# Patient Record
Sex: FEMALE | Race: WHITE
[De-identification: names, ages, dates, MRNs, and addresses within clinical notes are randomized per-mention and may not be internally consistent; named-entity substitution may affect disease eponyms.]

---

## 2019-02-14 ENCOUNTER — HOSPITAL ENCOUNTER (INPATIENT)
Dept: HOSPITAL 74 - JLDR | Age: 34
LOS: 3 days | Discharge: HOME | DRG: 560 | End: 2019-02-17
Attending: OBSTETRICS & GYNECOLOGY | Admitting: OBSTETRICS & GYNECOLOGY
Payer: COMMERCIAL

## 2019-02-14 VITALS — BODY MASS INDEX: 33.1 KG/M2

## 2019-02-14 DIAGNOSIS — Z3A.39: ICD-10-CM

## 2019-02-14 LAB
AMPHET UR-MCNC: NEGATIVE NG/ML
ANION GAP SERPL CALC-SCNC: 10 MMOL/L (ref 8–16)
APTT BLD: 26.9 SECONDS (ref 25.2–36.5)
BARBITURATES UR-MCNC: NEGATIVE NG/ML
BASOPHILS # BLD: 0.1 % (ref 0–2)
BENZODIAZ UR SCN-MCNC: NEGATIVE NG/ML
BUN SERPL-MCNC: 10 MG/DL (ref 7–18)
CALCIUM SERPL-MCNC: 7.6 MG/DL (ref 8.5–10.1)
CHLORIDE SERPL-SCNC: 106 MMOL/L (ref 98–107)
CO2 SERPL-SCNC: 23 MMOL/L (ref 21–32)
COCAINE UR-MCNC: NEGATIVE NG/ML
CREAT SERPL-MCNC: 0.6 MG/DL (ref 0.55–1.3)
DEPRECATED RDW RBC AUTO: 14.4 % (ref 11.6–15.6)
EOSINOPHIL # BLD: 0.3 % (ref 0–4.5)
GLUCOSE SERPL-MCNC: 166 MG/DL (ref 74–106)
HCT VFR BLD CALC: 36.9 % (ref 32.4–45.2)
HGB BLD-MCNC: 12.9 GM/DL (ref 10.7–15.3)
INR BLD: 0.97 (ref 0.83–1.09)
LYMPHOCYTES # BLD: 14.5 % (ref 8–40)
MCH RBC QN AUTO: 31.4 PG (ref 25.7–33.7)
MCHC RBC AUTO-ENTMCNC: 34.9 G/DL (ref 32–36)
MCV RBC: 90 FL (ref 80–96)
METHADONE UR-MCNC: NEGATIVE NG/ML
MONOCYTES # BLD AUTO: 5.1 % (ref 3.8–10.2)
NEUTROPHILS # BLD: 80 % (ref 42.8–82.8)
OPIATES UR QL SCN: NEGATIVE NG/ML
PCP UR QL SCN: NEGATIVE NG/ML
PLATELET # BLD AUTO: 150 K/MM3 (ref 134–434)
PMV BLD: 10 FL (ref 7.5–11.1)
POTASSIUM SERPLBLD-SCNC: 3.6 MMOL/L (ref 3.5–5.1)
PT PNL PPP: 11.5 SEC (ref 9.7–13)
RBC # BLD AUTO: 4.1 M/MM3 (ref 3.6–5.2)
SODIUM SERPL-SCNC: 138 MMOL/L (ref 136–145)
WBC # BLD AUTO: 6.5 K/MM3 (ref 4–10)

## 2019-02-14 PROCEDURE — G0008 ADMIN INFLUENZA VIRUS VAC: HCPCS

## 2019-02-14 RX ADMIN — AMPICILLIN SCH MLS/HR: 1 INJECTION, POWDER, FOR SOLUTION INTRAMUSCULAR; INTRAVENOUS at 21:07

## 2019-02-14 NOTE — HP
Past Medical History





- Primary Care Physician


PCP:: Jem Pagan





- Admission


Chief Complaint: pregnancy 39 weeks, prom


History Source: Patient


Limitations to Obtaining History: No Limitations





- Past Medical History


...: 4


...Para: 3


...Term: 2


...: 1


...EDC by Sono: 19





- Past Surgical History


Hx Myomectomy: No


Hx Transabdominal Cerclage: No





- Smoking History


Smoking history: Never smoked


Have you smoked in the past 12 months: No





- Alcohol/Substance Use


Hx Alcohol Use: No


History of Substance Use: reports: None





- Social History


History of Recent Travel: No





Home Medications





- Allergies


Allergies/Adverse Reactions: 


 Allergies











Allergy/AdvReac Type Severity Reaction Status Date / Time


 


No Known Allergies Allergy   Verified 19 17:20














- Home Medications


Home Medications: 


Ambulatory Orders





Pnv95/Iron Fum/Folic Acid [Prenatal Caplet] 1 tab PO DAILY 16 











Review of Systems





- Review of Systems


Constitutional: reports: No Symptoms


Eyes: reports: No Symptoms


HENT: reports: No Symptoms


Neck: reports: No Symptoms


Cardiovascular: reports: No Symptoms


Gastrointestinal: reports: No Symptoms


Genitourinary: reports: No Symptoms


Musculoskeletal: reports: No Symptoms


Integumentary: reports: No Symptoms


Neurological: reports: No Symptoms


Endocrine: reports: No Symptoms


Hematology/Lymphatic: reports: No Symptoms


Psychiatric: reports: No Symptoms





Physical Exam - Maternity


Vital Signs: 


 Vital Signs











Temperature  98.0 F   19 18:00


 


Pulse Rate  78   19 19:00


 


Respiratory Rate  20   19 19:00


 


Blood Pressure  126/69   19 19:00


 


O2 Sat by Pulse Oximetry (%)      











Constitutional: Yes: Well Nourished, No Distress, Calm


Eyes: Yes: WNL, Conjunctiva Clear, EOM Intact


HENT: Yes: WNL, Atraumatic, Normocephalic


Neck: Yes: WNL, Supple, Trachea Midline


Cardiovascular: Yes: WNL, Regular Rate and Rhythm


Breast(s): Yes: WNL





- Abdominal Exam/OB


Contractions: No


Intensity: Unaware


Fetal Monitor Mode: External


Fetal Heart Rate Location: [facility]


Category: I


Accelerations: Uniform


Decelerations: None





- Vaginal Exam/OB


Vaginal Bleediing: No


Speculum Exam: No


Dilatation (cm): 1


Effacement (%): 20


Amniotic Membrane Status: Ruptured


Nitrazine Test: Positive


Amniotic Fluid: Yes: Clear


Fetal Presentation: Vertex/Position


Fetal Station: -2





- Physical Exam


Musculoskeletal: Yes: WNL


Edema: Yes


Edema: LLE: Trace, RLE: Trace


Deep Tendon Reflex Grade: Normal +2


Psychiatric: Yes: WNL





- Labs


Lab Results: 


 CBC, BMP





 19 18:50 











Hemorrhage Risk Assessment





- Risk Factors


Medium Risk Factors: Yes: None


High Risk Factors: Yes: None


Risk Score: 1


Risk Level: Medium Risk





Problem List





- Problems


(1) Pregnancy with 39 completed weeks gestation


Code(s): Z3A.39 - 39 WEEKS GESTATION OF PREGNANCY   





(2) Pregnancy with 39 completed weeks gestation


Code(s): Z3A.39 - 39 WEEKS GESTATION OF PREGNANCY   





(3) PROM (premature rupture of membranes)


Code(s): O42.90 - LAURA ROM, 7TH0 BETW RUPT & ONST LABR, UNSP WEEKS OF GEST   


Qualifiers: 


   PROM onset of labor timing: onset of labor within 24 hours of rupture   PROM 

gestational age: -third trimester   Qualified Code(s): O42.013 -  

premature rupture of membranes, onset of labor within 24 hours of rupture, 

third trimester   





Assessment/Plan





plan admit, if no contraction will do cervidil induction


rba explained

## 2019-02-14 NOTE — PN
Progress Note (short form)





- Note


Progress Note: 








2245 hrs.


Pt. was trying to push.


Cx 2 cm., -2.


Advised not to push yet.


Stadol 1mg offered.

## 2019-02-14 NOTE — PN
Progress Note (short form)





- Note


Progress Note: 





Patient is Para 3, at term, with PROM at 11:30 am.


Occ. ctx, FH cat 1.


Cx 1m, 20% effaced, sta -2.


Cervidil placed.

## 2019-02-15 PROCEDURE — 3E0P7VZ INTRODUCTION OF HORMONE INTO FEMALE REPRODUCTIVE, VIA NATURAL OR ARTIFICIAL OPENING: ICD-10-PCS | Performed by: OBSTETRICS & GYNECOLOGY

## 2019-02-15 RX ADMIN — ACETAMINOPHEN PRN MG: 325 TABLET ORAL at 08:01

## 2019-02-15 RX ADMIN — ACETAMINOPHEN PRN MG: 325 TABLET ORAL at 20:42

## 2019-02-15 RX ADMIN — IBUPROFEN PRN MG: 600 TABLET, FILM COATED ORAL at 20:42

## 2019-02-15 RX ADMIN — IBUPROFEN PRN MG: 600 TABLET, FILM COATED ORAL at 03:35

## 2019-02-15 RX ADMIN — FERROUS SULFATE TAB EC 324 MG (65 MG FE EQUIVALENT) SCH MG: 324 (65 FE) TABLET DELAYED RESPONSE at 17:23

## 2019-02-15 RX ADMIN — ACETAMINOPHEN PRN MG: 325 TABLET ORAL at 04:47

## 2019-02-15 RX ADMIN — AMPICILLIN SCH MLS/HR: 1 INJECTION, POWDER, FOR SOLUTION INTRAMUSCULAR; INTRAVENOUS at 01:02

## 2019-02-15 RX ADMIN — ACETAMINOPHEN PRN MG: 325 TABLET ORAL at 03:35

## 2019-02-15 RX ADMIN — Medication SCH TAB: at 10:58

## 2019-02-15 RX ADMIN — FERROUS SULFATE TAB EC 324 MG (65 MG FE EQUIVALENT) SCH MG: 324 (65 FE) TABLET DELAYED RESPONSE at 08:00

## 2019-02-15 RX ADMIN — IBUPROFEN PRN MG: 600 TABLET, FILM COATED ORAL at 08:00

## 2019-02-15 RX ADMIN — AMPICILLIN SCH: 1 INJECTION, POWDER, FOR SOLUTION INTRAMUSCULAR; INTRAVENOUS at 05:14

## 2019-02-16 LAB
BASOPHILS # BLD: 0.4 % (ref 0–2)
DEPRECATED RDW RBC AUTO: 14.5 % (ref 11.6–15.6)
EOSINOPHIL # BLD: 1.6 % (ref 0–4.5)
HCT VFR BLD CALC: 31.8 % (ref 32.4–45.2)
HGB BLD-MCNC: 11.1 GM/DL (ref 10.7–15.3)
LYMPHOCYTES # BLD: 29.2 % (ref 8–40)
MCH RBC QN AUTO: 31.3 PG (ref 25.7–33.7)
MCHC RBC AUTO-ENTMCNC: 34.8 G/DL (ref 32–36)
MCV RBC: 89.9 FL (ref 80–96)
MONOCYTES # BLD AUTO: 6 % (ref 3.8–10.2)
NEUTROPHILS # BLD: 62.8 % (ref 42.8–82.8)
PLATELET # BLD AUTO: 132 K/MM3 (ref 134–434)
PMV BLD: 9.3 FL (ref 7.5–11.1)
RBC # BLD AUTO: 3.54 M/MM3 (ref 3.6–5.2)
WBC # BLD AUTO: 5.4 K/MM3 (ref 4–10)

## 2019-02-16 RX ADMIN — IBUPROFEN PRN MG: 600 TABLET, FILM COATED ORAL at 08:29

## 2019-02-16 RX ADMIN — ACETAMINOPHEN PRN MG: 325 TABLET ORAL at 08:28

## 2019-02-16 RX ADMIN — FERROUS SULFATE TAB EC 324 MG (65 MG FE EQUIVALENT) SCH MG: 324 (65 FE) TABLET DELAYED RESPONSE at 08:29

## 2019-02-16 RX ADMIN — FERROUS SULFATE TAB EC 324 MG (65 MG FE EQUIVALENT) SCH MG: 324 (65 FE) TABLET DELAYED RESPONSE at 16:55

## 2019-02-16 RX ADMIN — Medication SCH TAB: at 09:12

## 2019-02-16 RX ADMIN — ACETAMINOPHEN PRN MG: 325 TABLET ORAL at 21:07

## 2019-02-16 RX ADMIN — IBUPROFEN PRN MG: 600 TABLET, FILM COATED ORAL at 21:06

## 2019-02-16 NOTE — PN
Progress Note (short form)





- Note


Progress Note: 





ppd 1 doing well, no c/o , voids ok


 CBC, BMP





 19 06:44 





 19 18:50 





 Last Vital Signs











Temp Pulse Resp BP Pulse Ox


 


 97.7 F   64   18   120/69   99 


 


 19 08:50  19 08:50  19 08:50  19 08:50  02/15/19 04:56








abdomen soft, uterus firm, non tender 


lochia mild , 


no calf tenderness


plan ambulate , plan for d/c home in am





Problem List





- Problems


(1) Pregnancy with 39 completed weeks gestation


Code(s): Z3A.39 - 39 WEEKS GESTATION OF PREGNANCY   





(2) Pregnancy with 39 completed weeks gestation


Code(s): Z3A.39 - 39 WEEKS GESTATION OF PREGNANCY   





(3) PROM (premature rupture of membranes)


Code(s): O42.90 - LAURA ROM, 7TH0 BETW RUPT & ONST LABR, UNSP WEEKS OF GEST   


Qualifiers: 


   PROM onset of labor timing: onset of labor within 24 hours of rupture   PROM 

gestational age: -third trimester   Qualified Code(s): O42.013 -  

premature rupture of membranes, onset of labor within 24 hours of rupture, 

third trimester

## 2019-02-17 VITALS — TEMPERATURE: 98.4 F | HEART RATE: 69 BPM | SYSTOLIC BLOOD PRESSURE: 119 MMHG | DIASTOLIC BLOOD PRESSURE: 75 MMHG

## 2019-02-17 RX ADMIN — IBUPROFEN PRN MG: 600 TABLET, FILM COATED ORAL at 07:57

## 2019-02-17 RX ADMIN — FERROUS SULFATE TAB EC 324 MG (65 MG FE EQUIVALENT) SCH MG: 324 (65 FE) TABLET DELAYED RESPONSE at 07:50

## 2019-02-17 RX ADMIN — Medication SCH TAB: at 09:40

## 2019-02-17 RX ADMIN — ACETAMINOPHEN PRN MG: 325 TABLET ORAL at 07:58

## 2019-02-17 NOTE — DS
Physical Exam-GYN


Vital Signs: 


 Vital Signs











Temperature  98.3 F   19 22:00


 


Pulse Rate  60   19 22:00


 


Respiratory Rate  20   19 22:00


 


Blood Pressure  125/74   19 22:00


 


O2 Sat by Pulse Oximetry (%)  99   02/15/19 04:56











Constitutional: Yes: Well Nourished, No Distress, Calm


Eyes: Yes: WNL, Conjunctiva Clear, EOM Intact


HENT: Yes: WNL, Atraumatic, Normocephalic


Neck: Yes: WNL, Supple, Trachea Midline


Cardiovascular: Yes: WNL, Regular Rate and Rhythm


Respiratory: Yes: WNL, Regular, CTA Bilaterally


Gastrointestinal: Yes: WNL


...Rectal Exam: Yes: WNL


Renal/: Yes: WNL


....Post Partum: Yes: Uterus firm, Uterus non-tender, Slight lochia rubra


Breast(s): Yes: WNL


Musculoskeletal: Yes: WNL


Extremities: Yes: WNL


Edema: No


Integumentary: Yes: WNL


Neurological: Yes: WNL, Alert, Oriented


...Motor Strength: WNL


Psychiatric: Yes: WNL, Alert, Oriented


Labs: 


 CBC, BMP





 19 06:44 





 19 18:50 











Delivery





- Delivery


Vaginal Delivery: Spontaneous (no complication)


Type of Anesthesia: None


Episiotomy/Laceration: None


EBL (cc): 300





Delivery, Single Birth





- Stages of Labor


Date 1st Stage Initiatied: 19


Time 1st Stage Initiated: 23:00


Date 2nd Stage Initiated: 02/15/19


Time 2nd Stage Initiated: 03:00


Date of Delivery: 02/15/19


Time of Delivery: 03:13


Time Placenta Delivered: 03:15


Placenta: Yes: Spontaneous





- Condition of Infant


Pediatrician/Neonatologist Present: No


Name: Jasmine Tidwell


Infant Gender: Female


Birth Weight: 8 lb 5 oz


Position: Left, OA


Total Hours ROM (Hrs/Mins): 15hr/45mins





- Apgar


  ** 1 Minute


Apgar Total Score: 9





  ** 5 Minutes


Apgar Total Score: 9





- [place] Feeding Plan


Initial Plan: Elected not to breastfeed exclusively throughout hospitalization





Discharge Summary


Reason For Visit: LABOR


Current Active Problems





PROM (premature rupture of membranes) (Acute)


Pregnancy with 39 completed weeks gestation (Acute)


Pregnancy with 39 completed weeks gestation (Acute)








Procedures: Principal: MARIELENA


Hospital Course: 





no complication


Condition: Good





- Instructions


Diet, Activity, Other Instructions: 


regular diet, no intercourse, follow up clinic 4 weeks, if pain, heavy vaginal 

bleeding , fever call MD


Referrals: 


Jem Pagan MD [Staff Physician] - 


Disposition: HOME





- Home Medications


Comprehensive Discharge Medication List: 


Ambulatory Orders





Pnv95/Iron Fum/Folic Acid [Prenatal Caplet] 1 tab PO DAILY 16 


Ibuprofen [Motrin -] 600 mg PO QID #28 tablet 02/15/19

## 2022-11-17 ENCOUNTER — HOSPITAL ENCOUNTER (INPATIENT)
Dept: HOSPITAL 74 - JLDR | Age: 37
LOS: 2 days | Discharge: HOME | DRG: 560 | End: 2022-11-19
Attending: OBSTETRICS & GYNECOLOGY | Admitting: OBSTETRICS & GYNECOLOGY
Payer: COMMERCIAL

## 2022-11-17 VITALS — BODY MASS INDEX: 29.7 KG/M2

## 2022-11-17 DIAGNOSIS — O36.63X0: Primary | ICD-10-CM

## 2022-11-17 DIAGNOSIS — Z3A.38: ICD-10-CM

## 2022-11-17 LAB
ANION GAP SERPL CALC-SCNC: 12 MMOL/L (ref 8–16)
APTT BLD: 28.9 SECONDS (ref 25.2–36.5)
BASOPHILS # BLD: 0.2 % (ref 0–2)
BUN SERPL-MCNC: 11.7 MG/DL (ref 7–18)
CALCIUM SERPL-MCNC: 8.7 MG/DL (ref 8.5–10.1)
CHLORIDE SERPL-SCNC: 106 MMOL/L (ref 98–107)
CO2 SERPL-SCNC: 22 MMOL/L (ref 21–32)
CREAT SERPL-MCNC: 0.5 MG/DL (ref 0.55–1.3)
DEPRECATED RDW RBC AUTO: 14.2 % (ref 11.6–15.6)
EOSINOPHIL # BLD: 0.1 % (ref 0–4.5)
GLUCOSE SERPL-MCNC: 85 MG/DL (ref 74–106)
HCT VFR BLD CALC: 40.5 % (ref 32.4–45.2)
HGB BLD-MCNC: 13.6 GM/DL (ref 10.7–15.3)
INR BLD: 0.95 (ref 0.83–1.09)
LYMPHOCYTES # BLD: 12.8 % (ref 8–40)
MCH RBC QN AUTO: 29.9 PG (ref 25.7–33.7)
MCHC RBC AUTO-ENTMCNC: 33.6 G/DL (ref 32–36)
MCV RBC: 89 FL (ref 80–96)
MONOCYTES # BLD AUTO: 3.4 % (ref 3.8–10.2)
NEUTROPHILS # BLD: 83.5 % (ref 42.8–82.8)
PLATELET # BLD AUTO: 120 10^3/UL (ref 134–434)
PMV BLD: 10.6 FL (ref 7.5–11.1)
PT PNL PPP: 10.9 SEC (ref 9.7–13)
RBC # BLD AUTO: 4.55 M/MM3 (ref 3.6–5.2)
SODIUM SERPL-SCNC: 139 MMOL/L (ref 136–145)
WBC # BLD AUTO: 7 K/MM3 (ref 4–10)

## 2022-11-17 PROCEDURE — U0005 INFEC AGEN DETEC AMPLI PROBE: HCPCS

## 2022-11-17 PROCEDURE — U0003 INFECTIOUS AGENT DETECTION BY NUCLEIC ACID (DNA OR RNA); SEVERE ACUTE RESPIRATORY SYNDROME CORONAVIRUS 2 (SARS-COV-2) (CORONAVIRUS DISEASE [COVID-19]), AMPLIFIED PROBE TECHNIQUE, MAKING USE OF HIGH THROUGHPUT TECHNOLOGIES AS DESCRIBED BY CMS-2020-01-R: HCPCS

## 2022-11-17 RX ADMIN — IBUPROFEN PRN MG: 600 TABLET, FILM COATED ORAL at 18:05

## 2022-11-18 LAB
BASOPHILS # BLD: 0.2 % (ref 0–2)
DEPRECATED RDW RBC AUTO: 14.5 % (ref 11.6–15.6)
EOSINOPHIL # BLD: 0.2 % (ref 0–4.5)
HCT VFR BLD CALC: 37 % (ref 32.4–45.2)
HGB BLD-MCNC: 12.5 GM/DL (ref 10.7–15.3)
LYMPHOCYTES # BLD: 16.9 % (ref 8–40)
MCH RBC QN AUTO: 30.5 PG (ref 25.7–33.7)
MCHC RBC AUTO-ENTMCNC: 33.9 G/DL (ref 32–36)
MCV RBC: 90.1 FL (ref 80–96)
MONOCYTES # BLD AUTO: 3.2 % (ref 3.8–10.2)
NEUTROPHILS # BLD: 79.5 % (ref 42.8–82.8)
PLATELET # BLD AUTO: 99 10^3/UL (ref 134–434)
PMV BLD: 10.9 FL (ref 7.5–11.1)
RBC # BLD AUTO: 4.11 M/MM3 (ref 3.6–5.2)
WBC # BLD AUTO: 6.9 K/MM3 (ref 4–10)

## 2022-11-18 RX ADMIN — IBUPROFEN PRN MG: 600 TABLET, FILM COATED ORAL at 15:54

## 2022-11-18 RX ADMIN — IBUPROFEN PRN MG: 600 TABLET, FILM COATED ORAL at 10:39

## 2022-11-18 RX ADMIN — IBUPROFEN PRN MG: 600 TABLET, FILM COATED ORAL at 00:24

## 2022-11-18 RX ADMIN — IBUPROFEN PRN MG: 600 TABLET, FILM COATED ORAL at 21:24

## 2022-11-19 VITALS
DIASTOLIC BLOOD PRESSURE: 55 MMHG | HEART RATE: 70 BPM | RESPIRATION RATE: 18 BRPM | TEMPERATURE: 98 F | SYSTOLIC BLOOD PRESSURE: 104 MMHG

## 2022-11-19 RX ADMIN — IBUPROFEN PRN MG: 600 TABLET, FILM COATED ORAL at 08:11

## 2022-11-19 RX ADMIN — IBUPROFEN PRN MG: 600 TABLET, FILM COATED ORAL at 01:50

## 2023-01-19 ENCOUNTER — HOSPITAL ENCOUNTER (OUTPATIENT)
Dept: HOSPITAL 74 - JASU-SURG | Age: 38
Discharge: HOME | End: 2023-01-19
Attending: OBSTETRICS & GYNECOLOGY
Payer: COMMERCIAL

## 2023-01-19 VITALS — TEMPERATURE: 97.5 F

## 2023-01-19 VITALS — DIASTOLIC BLOOD PRESSURE: 80 MMHG | HEART RATE: 84 BPM | SYSTOLIC BLOOD PRESSURE: 125 MMHG

## 2023-01-19 VITALS — RESPIRATION RATE: 20 BRPM

## 2023-01-19 VITALS — BODY MASS INDEX: 35 KG/M2

## 2023-01-19 DIAGNOSIS — Z30.2: Primary | ICD-10-CM

## 2023-01-19 PROCEDURE — 0UT74ZZ RESECTION OF BILATERAL FALLOPIAN TUBES, PERCUTANEOUS ENDOSCOPIC APPROACH: ICD-10-PCS | Performed by: OBSTETRICS & GYNECOLOGY
